# Patient Record
Sex: FEMALE | Race: OTHER | HISPANIC OR LATINO | URBAN - METROPOLITAN AREA
[De-identification: names, ages, dates, MRNs, and addresses within clinical notes are randomized per-mention and may not be internally consistent; named-entity substitution may affect disease eponyms.]

---

## 2018-01-30 ENCOUNTER — OUTPATIENT (OUTPATIENT)
Dept: OUTPATIENT SERVICES | Facility: HOSPITAL | Age: 43
LOS: 1 days | End: 2018-01-30
Payer: COMMERCIAL

## 2018-01-30 VITALS
HEART RATE: 68 BPM | SYSTOLIC BLOOD PRESSURE: 113 MMHG | DIASTOLIC BLOOD PRESSURE: 74 MMHG | RESPIRATION RATE: 16 BRPM | TEMPERATURE: 99 F | WEIGHT: 151.9 LBS

## 2018-01-30 DIAGNOSIS — Z01.818 ENCOUNTER FOR OTHER PREPROCEDURAL EXAMINATION: ICD-10-CM

## 2018-01-30 DIAGNOSIS — K64.1 SECOND DEGREE HEMORRHOIDS: ICD-10-CM

## 2018-01-30 DIAGNOSIS — Z98.891 HISTORY OF UTERINE SCAR FROM PREVIOUS SURGERY: Chronic | ICD-10-CM

## 2018-01-30 DIAGNOSIS — Z98.890 OTHER SPECIFIED POSTPROCEDURAL STATES: Chronic | ICD-10-CM

## 2018-01-30 LAB
HCG SERPL-ACNC: <1 MIU/ML — SIGNIFICANT CHANGE UP
HCT VFR BLD CALC: 38.1 % — SIGNIFICANT CHANGE UP (ref 34.5–45)
HGB BLD-MCNC: 13 G/DL — SIGNIFICANT CHANGE UP (ref 11.5–15.5)
HIV 1+2 AB+HIV1 P24 AG SERPL QL IA: SIGNIFICANT CHANGE UP
MCHC RBC-ENTMCNC: 31.9 PG — SIGNIFICANT CHANGE UP (ref 27–34)
MCHC RBC-ENTMCNC: 34.1 GM/DL — SIGNIFICANT CHANGE UP (ref 32–36)
MCV RBC AUTO: 93.3 FL — SIGNIFICANT CHANGE UP (ref 80–100)
PLATELET # BLD AUTO: 292 K/UL — SIGNIFICANT CHANGE UP (ref 150–400)
RBC # BLD: 4.09 M/UL — SIGNIFICANT CHANGE UP (ref 3.8–5.2)
RBC # FLD: 12.7 % — SIGNIFICANT CHANGE UP (ref 10.3–14.5)
WBC # BLD: 9.2 K/UL — SIGNIFICANT CHANGE UP (ref 3.8–10.5)
WBC # FLD AUTO: 9.2 K/UL — SIGNIFICANT CHANGE UP (ref 3.8–10.5)

## 2018-01-30 PROCEDURE — G0463: CPT

## 2018-01-30 PROCEDURE — 85027 COMPLETE CBC AUTOMATED: CPT

## 2018-01-30 PROCEDURE — 84702 CHORIONIC GONADOTROPIN TEST: CPT

## 2018-01-30 PROCEDURE — 87389 HIV-1 AG W/HIV-1&-2 AB AG IA: CPT

## 2018-01-30 NOTE — H&P PST ADULT - PSH
History of colonoscopy History of colonoscopy    S/P breast biopsy  (Right breast, excision of benign cysts,  & )  S/P  section  ()

## 2018-01-30 NOTE — H&P PST ADULT - RS GEN PE MLT RESP DETAILS PC
respirations non-labored/normal/airway patent/breath sounds equal/clear to auscultation bilaterally/good air movement

## 2018-01-30 NOTE — H&P PST ADULT - HISTORY OF PRESENT ILLNESS
41yo female with no PMH here for PST. Pt states she has had lifelong constipation and "had bleeding from internal hemorrhoids in 11/2017". Pt with continued constipation but denies n/v and abdominal pain. Pt electing for hemorrhoidectomy on 2/6/18.

## 2018-01-30 NOTE — H&P PST ADULT - NSANTHOSAYNRD_GEN_A_CORE
No. YOUNG screening performed.  STOP BANG Legend: 0-2 = LOW Risk; 3-4 = INTERMEDIATE Risk; 5-8 = HIGH Risk

## 2018-01-30 NOTE — H&P PST ADULT - PROBLEM SELECTOR PLAN 2
Medical clearance needed as per Dr. Velázquez. CBC, HCG and HIV ordered. Pre-op instructions given and pt verbalized understanding.

## 2018-01-30 NOTE — H&P PST ADULT - GASTROINTESTINAL DETAILS
soft/nontender/normal/no rigidity/no distention/no bruit/no rebound tenderness/no guarding/no masses palpable/bowel sounds normal/no organomegaly

## 2018-02-05 RX ORDER — SODIUM CHLORIDE 9 MG/ML
1000 INJECTION, SOLUTION INTRAVENOUS
Qty: 0 | Refills: 0 | Status: DISCONTINUED | OUTPATIENT
Start: 2018-02-06 | End: 2018-02-21

## 2018-02-06 ENCOUNTER — OUTPATIENT (OUTPATIENT)
Dept: OUTPATIENT SERVICES | Facility: HOSPITAL | Age: 43
LOS: 1 days | End: 2018-02-06
Payer: COMMERCIAL

## 2018-02-06 VITALS
HEIGHT: 62 IN | RESPIRATION RATE: 14 BRPM | SYSTOLIC BLOOD PRESSURE: 111 MMHG | WEIGHT: 151.9 LBS | DIASTOLIC BLOOD PRESSURE: 70 MMHG | TEMPERATURE: 98 F | HEART RATE: 63 BPM | OXYGEN SATURATION: 100 %

## 2018-02-06 VITALS
DIASTOLIC BLOOD PRESSURE: 73 MMHG | HEART RATE: 54 BPM | RESPIRATION RATE: 16 BRPM | OXYGEN SATURATION: 98 % | SYSTOLIC BLOOD PRESSURE: 108 MMHG

## 2018-02-06 DIAGNOSIS — Z98.890 OTHER SPECIFIED POSTPROCEDURAL STATES: Chronic | ICD-10-CM

## 2018-02-06 DIAGNOSIS — Z01.818 ENCOUNTER FOR OTHER PREPROCEDURAL EXAMINATION: ICD-10-CM

## 2018-02-06 DIAGNOSIS — Z98.891 HISTORY OF UTERINE SCAR FROM PREVIOUS SURGERY: Chronic | ICD-10-CM

## 2018-02-06 DIAGNOSIS — K64.1 SECOND DEGREE HEMORRHOIDS: ICD-10-CM

## 2018-02-06 PROCEDURE — 88304 TISSUE EXAM BY PATHOLOGIST: CPT | Mod: 26

## 2018-02-06 PROCEDURE — 46260 REMOVE IN/EX HEM GROUPS 2+: CPT

## 2018-02-06 PROCEDURE — C1889: CPT

## 2018-02-06 PROCEDURE — 88304 TISSUE EXAM BY PATHOLOGIST: CPT

## 2018-02-06 RX ORDER — ACETAMINOPHEN 500 MG
1000 TABLET ORAL ONCE
Qty: 0 | Refills: 0 | Status: DISCONTINUED | OUTPATIENT
Start: 2018-02-06 | End: 2018-02-06

## 2018-02-06 RX ORDER — ACETAMINOPHEN 500 MG
1000 TABLET ORAL ONCE
Qty: 0 | Refills: 0 | Status: COMPLETED | OUTPATIENT
Start: 2018-02-06 | End: 2018-02-06

## 2018-02-06 RX ORDER — OXYCODONE HYDROCHLORIDE 5 MG/1
1 TABLET ORAL
Qty: 0 | Refills: 0 | COMMUNITY
Start: 2018-02-06

## 2018-02-06 RX ORDER — OXYCODONE HYDROCHLORIDE 5 MG/1
5 TABLET ORAL EVERY 4 HOURS
Qty: 0 | Refills: 0 | Status: DISCONTINUED | OUTPATIENT
Start: 2018-02-06 | End: 2018-02-06

## 2018-02-06 RX ORDER — HYDROMORPHONE HYDROCHLORIDE 2 MG/ML
0.5 INJECTION INTRAMUSCULAR; INTRAVENOUS; SUBCUTANEOUS
Qty: 0 | Refills: 0 | Status: DISCONTINUED | OUTPATIENT
Start: 2018-02-06 | End: 2018-02-06

## 2018-02-06 RX ORDER — SODIUM CHLORIDE 9 MG/ML
1000 INJECTION, SOLUTION INTRAVENOUS
Qty: 0 | Refills: 0 | Status: DISCONTINUED | OUTPATIENT
Start: 2018-02-06 | End: 2018-02-06

## 2018-02-06 RX ORDER — BUPIVACAINE 13.3 MG/ML
20 INJECTION, SUSPENSION, LIPOSOMAL INFILTRATION ONCE
Qty: 0 | Refills: 0 | Status: DISCONTINUED | OUTPATIENT
Start: 2018-02-06 | End: 2018-02-21

## 2018-02-06 RX ORDER — ONDANSETRON 8 MG/1
4 TABLET, FILM COATED ORAL ONCE
Qty: 0 | Refills: 0 | Status: DISCONTINUED | OUTPATIENT
Start: 2018-02-06 | End: 2018-02-06

## 2018-02-06 RX ADMIN — SODIUM CHLORIDE 40 MILLILITER(S): 9 INJECTION, SOLUTION INTRAVENOUS at 07:20

## 2018-02-06 RX ADMIN — SODIUM CHLORIDE 100 MILLILITER(S): 9 INJECTION, SOLUTION INTRAVENOUS at 09:48

## 2018-02-06 NOTE — BRIEF OPERATIVE NOTE - PRE-OP DX
Grade III hemorrhoids  02/06/2018    Johnny Lennon  Grade III hemorrhoids  02/06/2018    Johnny Lennon  Rectal pain  02/06/2018    Johnny Lennon

## 2018-02-06 NOTE — ASU DISCHARGE PLAN (ADULT/PEDIATRIC). - INSTRUCTIONS
High fiber diet and fiber supplements and 6-8 glasses of 8 ounces of water daily to promote BM.  Patient should take colace two pills per day, decrease if stools are loose, increase if stools are hard.  Milk of magnesium two tablespoon twice a day if no bm for 24hours.  Please discontniue once having BM.

## 2018-02-06 NOTE — ASU DISCHARGE PLAN (ADULT/PEDIATRIC). - MEDICATION SUMMARY - MEDICATIONS TO TAKE
I will START or STAY ON the medications listed below when I get home from the hospital:    oxyCODONE 5 mg oral tablet  -- 1 tab(s) by mouth every 4 hours, As needed, For moderate pain  -- Indication: For Grade II hemorrhoids

## 2018-02-06 NOTE — BRIEF OPERATIVE NOTE - PROCEDURE
<<-----Click on this checkbox to enter Procedure Proctoplasty for prolapse of mucous membrane  02/06/2018    Active  CSANZ  Hemorrhoidectomy  02/06/2018  External and internal X3  Active  CSANZ

## 2018-02-06 NOTE — ASU PATIENT PROFILE, ADULT - PSH
History of colonoscopy    S/P breast biopsy  (Right breast, excision of benign cysts,  & )  S/P  section  ()

## 2018-02-06 NOTE — ASU DISCHARGE PLAN (ADULT/PEDIATRIC). - MEDICATION SUMMARY - MEDICATIONS TO STOP TAKING
I will STOP taking the medications listed below when I get home from the hospital:    Dulcolax Laxative 5 mg oral delayed release tablet  -- 1 tab(s) by mouth once a day, As Needed

## 2018-02-07 LAB — SURGICAL PATHOLOGY FINAL REPORT - CH: SIGNIFICANT CHANGE UP

## 2018-07-27 ENCOUNTER — OUTPATIENT (OUTPATIENT)
Dept: OUTPATIENT SERVICES | Facility: HOSPITAL | Age: 43
LOS: 1 days | End: 2018-07-27
Payer: COMMERCIAL

## 2018-07-27 VITALS
TEMPERATURE: 97 F | WEIGHT: 160.06 LBS | DIASTOLIC BLOOD PRESSURE: 73 MMHG | HEART RATE: 64 BPM | SYSTOLIC BLOOD PRESSURE: 108 MMHG | OXYGEN SATURATION: 99 % | HEIGHT: 62 IN | RESPIRATION RATE: 16 BRPM

## 2018-07-27 DIAGNOSIS — Z98.890 OTHER SPECIFIED POSTPROCEDURAL STATES: Chronic | ICD-10-CM

## 2018-07-27 DIAGNOSIS — K64.2 THIRD DEGREE HEMORRHOIDS: ICD-10-CM

## 2018-07-27 DIAGNOSIS — Z01.818 ENCOUNTER FOR OTHER PREPROCEDURAL EXAMINATION: ICD-10-CM

## 2018-07-27 DIAGNOSIS — Z98.891 HISTORY OF UTERINE SCAR FROM PREVIOUS SURGERY: Chronic | ICD-10-CM

## 2018-07-27 LAB
ANION GAP SERPL CALC-SCNC: 8 MMOL/L — SIGNIFICANT CHANGE UP (ref 5–17)
BUN SERPL-MCNC: 12 MG/DL — SIGNIFICANT CHANGE UP (ref 7–23)
CALCIUM SERPL-MCNC: 9 MG/DL — SIGNIFICANT CHANGE UP (ref 8.4–10.5)
CHLORIDE SERPL-SCNC: 103 MMOL/L — SIGNIFICANT CHANGE UP (ref 96–108)
CO2 SERPL-SCNC: 26 MMOL/L — SIGNIFICANT CHANGE UP (ref 22–31)
CREAT SERPL-MCNC: 0.67 MG/DL — SIGNIFICANT CHANGE UP (ref 0.5–1.3)
GLUCOSE SERPL-MCNC: 94 MG/DL — SIGNIFICANT CHANGE UP (ref 70–99)
HCT VFR BLD CALC: 37.5 % — SIGNIFICANT CHANGE UP (ref 34.5–45)
HGB BLD-MCNC: 13.1 G/DL — SIGNIFICANT CHANGE UP (ref 11.5–15.5)
MCHC RBC-ENTMCNC: 31.3 PG — SIGNIFICANT CHANGE UP (ref 27–34)
MCHC RBC-ENTMCNC: 34.9 GM/DL — SIGNIFICANT CHANGE UP (ref 32–36)
MCV RBC AUTO: 89.7 FL — SIGNIFICANT CHANGE UP (ref 80–100)
PLATELET # BLD AUTO: 324 K/UL — SIGNIFICANT CHANGE UP (ref 150–400)
POTASSIUM SERPL-MCNC: 4 MMOL/L — SIGNIFICANT CHANGE UP (ref 3.5–5.3)
POTASSIUM SERPL-SCNC: 4 MMOL/L — SIGNIFICANT CHANGE UP (ref 3.5–5.3)
RBC # BLD: 4.18 M/UL — SIGNIFICANT CHANGE UP (ref 3.8–5.2)
RBC # FLD: 13.7 % — SIGNIFICANT CHANGE UP (ref 10.3–14.5)
SODIUM SERPL-SCNC: 137 MMOL/L — SIGNIFICANT CHANGE UP (ref 135–145)
WBC # BLD: 8.64 K/UL — SIGNIFICANT CHANGE UP (ref 3.8–10.5)
WBC # FLD AUTO: 8.64 K/UL — SIGNIFICANT CHANGE UP (ref 3.8–10.5)

## 2018-07-27 PROCEDURE — 80048 BASIC METABOLIC PNL TOTAL CA: CPT

## 2018-07-27 PROCEDURE — G0463: CPT

## 2018-07-27 PROCEDURE — 85027 COMPLETE CBC AUTOMATED: CPT

## 2018-07-27 RX ORDER — SODIUM CHLORIDE 9 MG/ML
3 INJECTION INTRAMUSCULAR; INTRAVENOUS; SUBCUTANEOUS EVERY 8 HOURS
Qty: 0 | Refills: 0 | Status: DISCONTINUED | OUTPATIENT
Start: 2018-08-09 | End: 2018-08-24

## 2018-07-27 RX ORDER — LIDOCAINE HCL 20 MG/ML
0.2 VIAL (ML) INJECTION ONCE
Qty: 0 | Refills: 0 | Status: DISCONTINUED | OUTPATIENT
Start: 2018-08-09 | End: 2018-08-24

## 2018-07-27 NOTE — H&P PST ADULT - PSH
H/O hemorrhoidectomy  2018  History of colonoscopy    History of lithotripsy    S/P breast biopsy  (Right breast, excision of benign cysts,  & )  S/P  section  ()

## 2018-07-27 NOTE — H&P PST ADULT - HISTORY OF PRESENT ILLNESS
43yo female with h/o constipation, & hemorrhoids s/p hemorrhoidectomy on 2/6/18.  Pt c/o rectal bleeding x 3 months, f/u surgical evaluation- 3rd degree hemorrhoids- scheduled for hemorrhoidectomy on 08/09/2018

## 2018-07-28 PROBLEM — K64.1 SECOND DEGREE HEMORRHOIDS: Chronic | Status: INACTIVE | Noted: 2018-01-30 | Resolved: 2018-07-27

## 2018-08-09 ENCOUNTER — OUTPATIENT (OUTPATIENT)
Dept: OUTPATIENT SERVICES | Facility: HOSPITAL | Age: 43
LOS: 1 days | End: 2018-08-09
Payer: COMMERCIAL

## 2018-08-09 VITALS
HEART RATE: 88 BPM | RESPIRATION RATE: 20 BRPM | OXYGEN SATURATION: 100 % | SYSTOLIC BLOOD PRESSURE: 102 MMHG | DIASTOLIC BLOOD PRESSURE: 70 MMHG

## 2018-08-09 VITALS
WEIGHT: 160.06 LBS | OXYGEN SATURATION: 100 % | DIASTOLIC BLOOD PRESSURE: 63 MMHG | HEIGHT: 62 IN | SYSTOLIC BLOOD PRESSURE: 96 MMHG | RESPIRATION RATE: 16 BRPM | HEART RATE: 51 BPM | TEMPERATURE: 98 F

## 2018-08-09 DIAGNOSIS — Z01.818 ENCOUNTER FOR OTHER PREPROCEDURAL EXAMINATION: ICD-10-CM

## 2018-08-09 DIAGNOSIS — Z98.890 OTHER SPECIFIED POSTPROCEDURAL STATES: Chronic | ICD-10-CM

## 2018-08-09 DIAGNOSIS — K64.2 THIRD DEGREE HEMORRHOIDS: ICD-10-CM

## 2018-08-09 DIAGNOSIS — Z98.891 HISTORY OF UTERINE SCAR FROM PREVIOUS SURGERY: Chronic | ICD-10-CM

## 2018-08-09 PROCEDURE — 88304 TISSUE EXAM BY PATHOLOGIST: CPT | Mod: 26

## 2018-08-09 PROCEDURE — 88304 TISSUE EXAM BY PATHOLOGIST: CPT

## 2018-08-09 PROCEDURE — 46260 REMOVE IN/EX HEM GROUPS 2+: CPT

## 2018-08-09 RX ORDER — CEFOTETAN DISODIUM 1 G
2 VIAL (EA) INJECTION ONCE
Qty: 0 | Refills: 0 | Status: DISCONTINUED | OUTPATIENT
Start: 2018-08-09 | End: 2018-08-24

## 2018-08-09 RX ORDER — CELECOXIB 200 MG/1
200 CAPSULE ORAL ONCE
Qty: 0 | Refills: 0 | Status: DISCONTINUED | OUTPATIENT
Start: 2018-08-09 | End: 2018-08-24

## 2018-08-09 RX ORDER — SODIUM CHLORIDE 9 MG/ML
1000 INJECTION, SOLUTION INTRAVENOUS
Qty: 0 | Refills: 0 | Status: DISCONTINUED | OUTPATIENT
Start: 2018-08-09 | End: 2018-08-24

## 2018-08-09 RX ORDER — ACETAMINOPHEN 500 MG
1000 TABLET ORAL ONCE
Qty: 0 | Refills: 0 | Status: COMPLETED | OUTPATIENT
Start: 2018-08-09 | End: 2018-08-09

## 2018-08-09 RX ORDER — CELECOXIB 200 MG/1
200 CAPSULE ORAL ONCE
Qty: 0 | Refills: 0 | Status: COMPLETED | OUTPATIENT
Start: 2018-08-09 | End: 2018-08-09

## 2018-08-09 RX ORDER — OXYCODONE HYDROCHLORIDE 5 MG/1
10 TABLET ORAL ONCE
Qty: 0 | Refills: 0 | Status: DISCONTINUED | OUTPATIENT
Start: 2018-08-09 | End: 2018-08-09

## 2018-08-09 RX ORDER — ONDANSETRON 8 MG/1
4 TABLET, FILM COATED ORAL ONCE
Qty: 0 | Refills: 0 | Status: DISCONTINUED | OUTPATIENT
Start: 2018-08-09 | End: 2018-08-24

## 2018-08-09 RX ADMIN — CELECOXIB 200 MILLIGRAM(S): 200 CAPSULE ORAL at 09:26

## 2018-08-09 RX ADMIN — Medication 1000 MILLIGRAM(S): at 09:26

## 2018-08-09 NOTE — ASU DISCHARGE PLAN (ADULT/PEDIATRIC). - NOTIFY
Pain not relieved by Medications/Fever greater than 101/Persistent Nausea and Vomiting/Bleeding that does not stop Bleeding that does not stop/Fever greater than 101/Pain not relieved by Medications

## 2018-08-09 NOTE — ASU DISCHARGE PLAN (ADULT/PEDIATRIC). - ITEMS TO FOLLOWUP WITH YOUR PHYSICIAN'S
Please follow up with Dr. Velázquez within 2 weeks of your discharge, please call office to arrange for your appointment

## 2018-08-09 NOTE — ASU DISCHARGE PLAN (ADULT/PEDIATRIC). - SPECIAL INSTRUCTIONS
Never smoker
Please take out the dressing tomorrow. Please take warm Sitz bath twice a day. Your prescription is sent to your pharmacy, please take it with colace and milk of magnesium. You may expect some mild bleeding/clotting in the first few days after surgery, which is normal.

## 2018-08-09 NOTE — ASU DISCHARGE PLAN (ADULT/PEDIATRIC). - MEDICATION SUMMARY - MEDICATIONS TO TAKE
I will START or STAY ON the medications listed below when I get home from the hospital:    Multiple Vitamins oral tablet  -- 1 tab(s) by mouth once a day  -- Indication: For supplement

## 2018-08-15 LAB — SURGICAL PATHOLOGY STUDY: SIGNIFICANT CHANGE UP

## 2019-02-06 NOTE — ASU PREOP CHECKLIST - NEEDLE GAUGE
[FreeTextEntry1] : MEE h/o right breast cancer and brca\par ovaries removed\par  some pain and is thinking of reducing size of breast\par RTC 6months\par clear for surgery with Dr Saucedo from my point of view\par Patient verbalized understanding.\par  used. \par KARL was informed that I am leaving [facility]  and will be practicing at an outside institution. The patient understands that there should not be a gap in care and was offered to establish care under my replacement Dipti Mensah  at [facility] or can choose to continue under my care at [facility]. The patient verbalized understanding of the above.\par 
20

## 2020-07-23 NOTE — PRE-ANESTHESIA EVALUATION ADULT - NSANTHAGERD_ENT_A_CORE
Patient at the [facility] office, patient known for renal calculi  Patient s/p CYSTOSCOPY URETEROSCOPY WITH LITHOTRIPSY HIGH POWERED LASER, RETROGRADE PYELOGRAM, BASKET STONE EXTRACTION, AND INSERTION STENT URETERAL on 7/22/2020 with Dr Teddy Cox  Per AVS patient to have stent on string removed on Monday 7/27/2020  Spoke to patient at this time, offered to provide patient with steps to remove stent on Monday at home  Patient states she does not feel comfortable removing the stent in home and wants an appt to have done  Patient scheduled 7/27/2020 at 9:30am for stent removal     Patient states she feels she has some pressure due to the stent  Advised this is normal  Advised may have flank pain, nausea, burning with urination as well as frequency and visible blood in urine  Advised symptoms can last up to 48hrs after removal of stent    Advised aggressive hydration rotate between tylenol and ibprophen and warm compress       Patient thankful for information and verbalized understanding No

## 2021-11-01 NOTE — H&P PST ADULT - PRO TOBACCO TYPE
Patient's priors requested from [facility].   
Quit 18 years, 2 cigarettes daily x 2 years/cigarettes

## 2023-05-14 NOTE — ASU PREOP CHECKLIST - NS PREOP CHK MONITOR ANESTHESIA CONSENT
Medications:  Continuous Infusions:   sodium chloride 0.9%       Scheduled Meds:   aspirin  81 mg Oral Daily    atorvastatin  80 mg Oral Daily    clopidogreL  75 mg Oral Daily    ezetimibe  10 mg Oral Daily    gabapentin  100 mg Oral BID    heparin (porcine)  5,000 Units Subcutaneous Q8H    NIFEdipine  30 mg Oral Daily    polyethylene glycol  17 g Oral Daily    senna-docusate 8.6-50 mg  1 tablet Oral Daily    sodium bicarbonate  650 mg Oral TID     PRN Meds:acetaminophen, albuterol-ipratropium, dextrose 10%, dextrose 10%, dextrose, dextrose, glucagon (human recombinant), hydrALAZINE, HYDROmorphone, insulin aspart U-100, naloxone, ondansetron, oxyCODONE, sodium chloride 0.9%     Objective:     Vital Signs (Most Recent):  Temp: 97.2 °F (36.2 °C) (05/14/23 0812)  Pulse: 85 (05/14/23 0812)  Resp: 20 (05/14/23 0812)  BP: (!) 156/67 (05/14/23 0812)  SpO2: 98 % (05/14/23 0812) Vital Signs (24h Range):  Temp:  [96.4 °F (35.8 °C)-99 °F (37.2 °C)] 97.2 °F (36.2 °C)  Pulse:  [73-85] 85  Resp:  [16-20] 20  SpO2:  [93 %-99 %] 98 %  BP: (134-156)/(61-70) 156/67            Physical Exam  Constitutional:       General: She is not in acute distress.     Appearance: She is not ill-appearing.   HENT:      Head: Normocephalic and atraumatic.      Mouth/Throat:      Mouth: Mucous membranes are moist.   Cardiovascular:      Rate and Rhythm: Normal rate.      Comments: R mono DP  R triphasic PT  Pulmonary:      Effort: Pulmonary effort is normal. No respiratory distress.   Abdominal:      Palpations: Abdomen is soft.   Musculoskeletal:         General: Normal range of motion.      Cervical back: Normal range of motion.   Skin:     General: Skin is warm and dry.      Comments: Redness to dorsum of foot    Neurological:      General: No focal deficit present.      Mental Status: She is alert.   Psychiatric:         Mood and Affect: Mood normal.         Behavior: Behavior normal.        Significant Labs:  CBC:   Recent Labs   Lab  05/13/23 0434   WBC 7.87   RBC 2.59*   HGB 7.2*   HCT 24.1*   *   MCV 93   MCH 27.8   MCHC 29.9*       CMP:   Recent Labs   Lab 05/13/23 0434   GLU 99  101   CALCIUM 8.4*  8.4*   ALBUMIN 2.2*  2.2*   PROT 5.7*     141   K 4.6  4.6   CO2 21*  22*   *  111*   BUN 64*  64*   CREATININE 1.9*  1.9*   ALKPHOS 99   ALT <5*   AST 42*   BILITOT 0.6         Significant Diagnostics:  I have reviewed all pertinent imaging results/findings within the past 24 hours.   done

## 2024-07-18 NOTE — H&P PST ADULT - TEMPERATURE IN CELSIUS (DEGREES C)
What Type Of Note Output Would You Prefer (Optional)?: Standard Output How Severe Are Your Spot(S)?: mild Hpi Title: Evaluation of Skin Lesions 36

## 2025-03-18 NOTE — PRE-ANESTHESIA EVALUATION ADULT - RESPIRATORY RATE (BREATHS/MIN)
Elise Tabares is a 79 y.o. female follow up for         1. Have you been to the ER, urgent care clinic since your last visit?  Hospitalized since your last visit?{no    2. Have you seen or consulted any other health care providers outside of the [facility] since your last visit?  Include any pap smears or colon screening. no   disease in the abdomen or pelvis.    11/27/24 CT c/a/p:  IMPRESSION:  Stable postsurgical changes left upper lobe. Persistent small left pleural effusion, improved aeration left lower lobe compared to the prior exam. No evidence of metastatic disease in the abdomen or pelvis. New right hydroureteronephrosis to the level of the bladder. There is concern for high density material within the distal ureter, this may represent small stones, blood products, or small neoplasm. Follow-up is recommended.        Assessment & Plan:   Elise Tabares is a 79 y.o. female is seen for lung cancer.     1. 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 S/p JANELL wedge resection 5/2/23 by Dr. Dom Perez at [facility]. Given high risk features of tumor > 4cm, presence of visceral and vascular invasion, I recommend adjuvant chemo-immunotherapy: Completed adjuvant Carboplatin-Gemcitabine x 4 cycles, followed by Atezolizumab every 3 weeks. Discontinued Atezolizumab due to metastatic disease on imaging.   CT chest  in June 2024 showed marked disease progression. Brain MRI negative for metastatic disease. Guardant shows no actionable mutations. Reviewed with Thoracic Tumor board on 7/2/24 - rapid appearance of nodularity in left lower lung pleural with small effusion as well as tumor extending into 2nd rib.   Guardant testing showed no actionable mutations. We discussed the risks and benefits of Docetaxel chemotherapy. Potential side effects include, but are not limited to, nausea, vomiting, diarrhea, constipation, mucositis, taste changes, myelosuppression, infection, fatigue, alopecia, pulmonary toxicity, neuropathy, allergic reactions, infertility, rash and rarely, death. The patient has consented to beginning chemotherapy. Consent signed, scanned under media.   Supportive meds: zofran, compazine, EMLA lidocaine cream.   CT CAP [id number] shows stable  16